# Patient Record
Sex: MALE | Race: WHITE | Employment: FULL TIME | URBAN - METROPOLITAN AREA
[De-identification: names, ages, dates, MRNs, and addresses within clinical notes are randomized per-mention and may not be internally consistent; named-entity substitution may affect disease eponyms.]

---

## 2020-10-20 PROBLEM — E78.00 HIGH CHOLESTEROL: Status: ACTIVE | Noted: 2020-10-20

## 2020-11-17 PROCEDURE — 88305 TISSUE EXAM BY PATHOLOGIST: CPT | Performed by: INTERNAL MEDICINE

## 2021-06-03 ENCOUNTER — OFFICE VISIT (OUTPATIENT)
Dept: PODIATRY CLINIC | Facility: CLINIC | Age: 51
End: 2021-06-03
Payer: COMMERCIAL

## 2021-06-03 VITALS — BODY MASS INDEX: 25.06 KG/M2 | HEIGHT: 72 IN | WEIGHT: 185 LBS

## 2021-06-03 DIAGNOSIS — M76.72 PERONEAL TENDINITIS, LEFT: ICD-10-CM

## 2021-06-03 DIAGNOSIS — M79.672 LEFT FOOT PAIN: Primary | ICD-10-CM

## 2021-06-03 DIAGNOSIS — S92.352A CLOSED FRACTURE OF BASE OF FIFTH METATARSAL BONE OF LEFT FOOT, INITIAL ENCOUNTER: ICD-10-CM

## 2021-06-03 PROCEDURE — 3008F BODY MASS INDEX DOCD: CPT | Performed by: PODIATRIST

## 2021-06-03 PROCEDURE — 99203 OFFICE O/P NEW LOW 30 MIN: CPT | Performed by: PODIATRIST

## 2021-06-03 PROCEDURE — L4360 PNEUMAT WALKING BOOT PRE CST: HCPCS | Performed by: PODIATRIST

## 2021-06-03 NOTE — PROGRESS NOTES
Iwona Bynum is a 45year old male.  Patient presents with:  Consult: patient hurt his foot 2-3 weeks ago ,left lateral pain ,while wb and certain ROM can cause a 10/10         HPI:   Portia Arellano presents to the clinic with a chief complaint of pain on the ou developed, well nourished, in no apparent distress  EXTREMITIES:   1. Integument: The skin on his left foot is warm and dry he has a little swelling mild erythema around the fifth metatarsal base but it is minimal.   2. Vascular:  The patient has palpable p